# Patient Record
Sex: FEMALE | Race: BLACK OR AFRICAN AMERICAN | NOT HISPANIC OR LATINO | URBAN - METROPOLITAN AREA
[De-identification: names, ages, dates, MRNs, and addresses within clinical notes are randomized per-mention and may not be internally consistent; named-entity substitution may affect disease eponyms.]

---

## 2023-05-06 ENCOUNTER — EMERGENCY (EMERGENCY)
Facility: HOSPITAL | Age: 63
LOS: 0 days | Discharge: HOME | End: 2023-05-06
Attending: EMERGENCY MEDICINE
Payer: COMMERCIAL

## 2023-05-06 VITALS
HEART RATE: 67 BPM | TEMPERATURE: 99 F | SYSTOLIC BLOOD PRESSURE: 149 MMHG | RESPIRATION RATE: 18 BRPM | HEIGHT: 64.4 IN | DIASTOLIC BLOOD PRESSURE: 80 MMHG | OXYGEN SATURATION: 96 % | WEIGHT: 179.9 LBS

## 2023-05-06 DIAGNOSIS — M54.16 RADICULOPATHY, LUMBAR REGION: ICD-10-CM

## 2023-05-06 DIAGNOSIS — M54.9 DORSALGIA, UNSPECIFIED: ICD-10-CM

## 2023-05-06 DIAGNOSIS — M79.651 PAIN IN RIGHT THIGH: ICD-10-CM

## 2023-05-06 DIAGNOSIS — R20.0 ANESTHESIA OF SKIN: ICD-10-CM

## 2023-05-06 PROCEDURE — 99284 EMERGENCY DEPT VISIT MOD MDM: CPT

## 2023-05-06 PROCEDURE — 72100 X-RAY EXAM L-S SPINE 2/3 VWS: CPT | Mod: 26

## 2023-05-06 RX ORDER — OXYCODONE HYDROCHLORIDE 5 MG/1
1 TABLET ORAL
Qty: 4 | Refills: 0
Start: 2023-05-06 | End: 2023-05-07

## 2023-05-06 RX ORDER — DEXAMETHASONE 0.5 MG/5ML
10 ELIXIR ORAL ONCE
Refills: 0 | Status: COMPLETED | OUTPATIENT
Start: 2023-05-06 | End: 2023-05-06

## 2023-05-06 RX ORDER — LIDOCAINE 4 G/100G
1 CREAM TOPICAL ONCE
Refills: 0 | Status: COMPLETED | OUTPATIENT
Start: 2023-05-06 | End: 2023-05-06

## 2023-05-06 RX ORDER — LIDOCAINE 4 G/100G
1 CREAM TOPICAL
Qty: 3 | Refills: 0
Start: 2023-05-06 | End: 2023-05-08

## 2023-05-06 RX ORDER — METHOCARBAMOL 500 MG/1
2 TABLET, FILM COATED ORAL
Qty: 40 | Refills: 0
Start: 2023-05-06 | End: 2023-05-10

## 2023-05-06 RX ORDER — IBUPROFEN 200 MG
600 TABLET ORAL ONCE
Refills: 0 | Status: COMPLETED | OUTPATIENT
Start: 2023-05-06 | End: 2023-05-06

## 2023-05-06 RX ORDER — IBUPROFEN 200 MG
1 TABLET ORAL
Qty: 30 | Refills: 0
Start: 2023-05-06 | End: 2023-05-10

## 2023-05-06 RX ORDER — METHOCARBAMOL 500 MG/1
1000 TABLET, FILM COATED ORAL ONCE
Refills: 0 | Status: COMPLETED | OUTPATIENT
Start: 2023-05-06 | End: 2023-05-06

## 2023-05-06 RX ADMIN — Medication 600 MILLIGRAM(S): at 14:19

## 2023-05-06 RX ADMIN — Medication 10 MILLIGRAM(S): at 14:20

## 2023-05-06 RX ADMIN — METHOCARBAMOL 1000 MILLIGRAM(S): 500 TABLET, FILM COATED ORAL at 14:19

## 2023-05-06 RX ADMIN — LIDOCAINE 1 PATCH: 4 CREAM TOPICAL at 14:20

## 2023-05-06 NOTE — ED PROVIDER NOTE - PATIENT PORTAL LINK FT
You can access the FollowMyHealth Patient Portal offered by [facility] by registering at the following website: [email or web address]. By joining [email or web address]’s FollowMyHealth portal, you will also be able to view your health information using other applications (apps) compatible with our system.

## 2023-05-06 NOTE — ED PROVIDER NOTE - NSFOLLOWUPINSTRUCTIONS_ED_ALL_ED_FT
1) Take tylenol for pain  2) Take prescription medication as instructed  3) Follow-up with spine doctor/Orthopedics  4) Follow up with your primary care doctor  5) Return to the ER for worsening or concerning symptoms

## 2023-05-06 NOTE — ED ADULT NURSE NOTE - OBJECTIVE STATEMENT
Pt is A&OX4, difficulty ambulating due to R buttock pain that radiates to the right knee. Complaining of pain since Sunday. Two days ago started feeling numbness. Today numbness and pressure got worse. Pt took Aleve and Tylenol yesterday but pressure and numbness to area remains. Not in acute distress.

## 2023-05-06 NOTE — ED PROVIDER NOTE - OBJECTIVE STATEMENT
This patient is a 62 year old woman who presents to the ER c/o right sided back pain radiating to her right thigh  associated with numbness to the thigh.  Patient also reporting decrease range of motion at lumbar spine.  She denies injury, fall or trauma but does report that she has been doing a lot of physical activity with cleaning and taking care of her elderly father.  NO dysuria, hematuria, urinary retention or fecal incontinence.  No fever.

## 2023-05-06 NOTE — ED PROVIDER NOTE - NEUROLOGICAL, MLM
Pt is asking for a referral to a dietitian.   
Alert and oriented, no focal deficits, no motor or sensory deficits.

## 2023-05-06 NOTE — ED ADULT TRIAGE NOTE - CHIEF COMPLAINT QUOTE
Patient walked in complaining pain to R buttocks that radiates to R thigh and R knee for the 7 days. Pt took Aleve and Tylenol yesterday but pressure and numbness to area remains.

## 2024-06-10 NOTE — ED PROVIDER NOTE - TEMPLATE, MLM
Subjective:       Patient ID: Janina Klein is a 61 y.o. female.    Chief Complaint: lab results concern, Withdrawal (Methadone), and pain management referral    Janina Klein presents to the clinic today for follow up/discuss referral  Established within the clinic, new patient to myself    Under the care of the following:  PCP:Dr. Gonzáles, recently established- previously under Dr. Tong   Has been under the [facility] in [place]- reports she was receiving Methadone from them until recently she has been unable to make her appointments. Reports her sister that was bringing her daily, but she recently unexpectedly passed away and is not able to make it to these appointments. She is here today to request a referral to Dr. Venkatesh Gordon with [facility]    She has been Donating Plasma at [facility] in [place], but reports recently attempted to donate at an alternative clinic and states she was told that she needed a letter to donate due to her Metoprolol inferring with the anticoagulant that they use and reports she needed a letter to be able to discontinue   Last blood work from 4/2023 shows decreased hemoglobin.       Review of Systems    Patient Active Problem List   Diagnosis    Essential hypertension    Abdominal pain    History of abnormal Pap smear    Sleep disorder    Chronic bilateral low back pain with bilateral sciatica    Heavy cigarette smoker    Gastroesophageal reflux disease    Anxiety    Osteoarthritis of lumbar spine    Osteoarthritis of cervical spine    BMI 24.0-24.9, adult    Yousif's esophagus without dysplasia    COPD (chronic obstructive pulmonary disease)    Dyslipidemia (high LDL; low HDL)    Right upper quadrant abdominal pain    Hiatal hernia    Gastritis    Duodenitis    Esophagitis    Diverticulosis of colon    History of laparoscopic cholecystectomy    Diarrhea    History of esophageal stricture    COVID    Primary insomnia    Osteoarthritis of spine  "with radiculopathy, lumbar region       Objective:      Physical Exam  Vitals and nursing note reviewed.   Constitutional:       General: She is not in acute distress.     Appearance: Normal appearance. She is not ill-appearing.   Eyes:      Comments: Corrective lenses    Cardiovascular:      Rate and Rhythm: Normal rate and regular rhythm.      Heart sounds: Normal heart sounds. No murmur heard.  Pulmonary:      Effort: Pulmonary effort is normal.      Breath sounds: Normal breath sounds. No wheezing.   Skin:     General: Skin is warm and dry.      Findings: No rash.   Neurological:      Mental Status: She is alert and oriented to person, place, and time.   Psychiatric:         Mood and Affect: Mood normal.         Behavior: Behavior normal.         Lab Results   Component Value Date    WBC 10.66 04/26/2023    HGB 11.9 (L) 04/26/2023    HCT 34.9 (L) 04/26/2023     04/26/2023    CHOL 188 07/20/2022    TRIG 136 07/20/2022    HDL 39 (L) 07/20/2022    ALT 14 04/26/2023    AST 16 04/26/2023     (L) 04/26/2023    K 4.8 04/26/2023     04/26/2023    CREATININE 0.7 04/26/2023    BUN 10 04/26/2023    CO2 23 04/26/2023    TSH 1.439 07/20/2022    HGBA1C 5.1 07/20/2022     The 10-year ASCVD risk score (Rosenda FITZPATRICK, et al., 2019) is: 11.7%    Values used to calculate the score:      Age: 61 years      Sex: Female      Is Non- : No      Diabetic: No      Tobacco smoker: Yes      Systolic Blood Pressure: 124 mmHg      Is BP treated: Yes      HDL Cholesterol: 39 mg/dL      Total Cholesterol: 188 mg/dL  Visit Vitals  BP (!) 124/92 (BP Location: Left arm, Patient Position: Sitting, BP Method: Medium (Manual))   Pulse 78   Temp 98.2 °F (36.8 °C) (Axillary)   Resp 12   Ht 5' 4" (1.626 m)   Wt 60.8 kg (134 lb 1.6 oz)   SpO2 97%   BMI 23.02 kg/m²      Assessment:       1. Essential hypertension    2. Vitamin D deficiency    3. Dyslipidemia (high LDL; low HDL)    4. Opioid dependence with " opioid-induced disorder    5. High risk medication use    6. Centrilobular emphysema    7. Plasma donor    8. Chronic pain syndrome    9. Osteoarthritis of spine with radiculopathy, lumbar region        Plan:       1. Essential hypertension  -     Comprehensive Metabolic Panel; Future; Expected date: 06/10/2024  The patient was counseled on HTN education, management and recommendations. The need for weight reduction was reinforced and a BMI goal of 19 to 25 was set.  Patient was encouraged to adhere to a low sodium diet and a DASH diet was recommended. Patient was also encouraged to engage in routine exercise such as walking most days of the week greater than 30 minutes. Patient education materials were provided to the patient for home review and further reinforcement of topics discussed.     Please monitor your blood pressure twice a day.  Make sure you have not had any caffeine or tobacco with in 45 minutes of checking your blood pressure.  Keep a log to bring to your next office visit.      2. Vitamin D deficiency  -     Vitamin D; Future; Expected date: 06/10/2024    3. Dyslipidemia (high LDL; low HDL)  -     Lipid Panel; Future; Expected date: 06/10/2024    4. Opioid dependence with opioid-induced disorder    5. High risk medication use  -     CBC Auto Differential; Future; Expected date: 06/10/2024    6. Centrilobular emphysema  -     Lipid Panel; Future; Expected date: 06/10/2024  Smoking cessation encouraged.   7. Plasma donor  -     CBC Auto Differential; Future; Expected date: 06/10/2024  Hemoglobin>12.5% in order to be able to donate   8. Chronic pain syndrome  -     Ambulatory referral/consult to Pain Clinic; Future; Expected date: 06/17/2024  Pain management referral provided. Physician Referral Request Form completed   9. Osteoarthritis of spine with radiculopathy, lumbar region  -     Ambulatory referral/consult to Pain Clinic; Future; Expected date: 06/17/2024       No follow-ups on file.      Future  Appointments       Date Provider Specialty Appt Notes    7/5/2024 Pepper Gonzáles MD Family Medicine 6mnth follow up              Back Pain